# Patient Record
Sex: MALE | Race: WHITE | NOT HISPANIC OR LATINO | Employment: UNEMPLOYED | URBAN - NONMETROPOLITAN AREA
[De-identification: names, ages, dates, MRNs, and addresses within clinical notes are randomized per-mention and may not be internally consistent; named-entity substitution may affect disease eponyms.]

---
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
## 2022-11-20 NOTE — PROGRESS NOTES
Lonnie Aviles  2000  16 y.o. male  Patient presents today for bilateral plantar warts.     08/14/2017  Chief Complaint   Patient presents with   • Left Foot - Plantar Warts   • Right Foot - Plantar Warts           History of Present Illness    Lonnie Aviles is a 16 y.o. male who presents accompanied by his mother for evaluation of bilateral foot skin lesions.  He states that he first noticed these lesions several weeks prior.  He does note sharp pain with weightbearing due to these lesions.  His mother believes there plantar warts.  He has a sister who also has plantar warts who is given a prescription acid to treat them.  Patient states he tried to assess with no improvement.  He denies similar skin lesions elsewhere.  He denies any other treatments for this issue.      Past Medical History:   Diagnosis Date   • Allergic rhinitis    • GERD (gastroesophageal reflux disease)    • Upper respiratory infection    • Well child visit          Past Surgical History:   Procedure Laterality Date   • ADENOIDECTOMY     • DENTAL PROCEDURE     • TONSILLECTOMY           Family History   Problem Relation Age of Onset   • Heart disease Maternal Grandmother    • COPD Maternal Grandmother    • Heart disease Paternal Grandmother    • COPD Paternal Grandmother          Social History     Social History   • Marital status: Single     Spouse name: N/A   • Number of children: N/A   • Years of education: N/A     Occupational History   • Not on file.     Social History Main Topics   • Smoking status: Never Smoker   • Smokeless tobacco: Not on file      Comment: parents smoke ecig   • Alcohol use No   • Drug use: No   • Sexual activity: Defer     Other Topics Concern   • Not on file     Social History Narrative         Current Outpatient Prescriptions   Medication Sig Dispense Refill   • cetirizine (zyrTEC) 10 MG tablet Take 1 tablet by mouth Daily. 30 tablet 0   • Loratadine 10 MG capsule Take 1  "tablet by mouth Daily.     • cimetidine (TAGAMET) 800 MG tablet Take 1 tablet by mouth 3 (Three) Times a Day. 90 tablet 0     No current facility-administered medications for this visit.          OBJECTIVE    Ht 73\" (185.4 cm)  Wt 280 lb (127 kg)  BMI 36.94 kg/m2      Review of Systems   Constitutional:  Denies recent weight loss, weight gain, fever or chills, no change in exercise tolerance  Musculoskeletal: Toe/foot pain.   Skin: Plantar warts  Neurological: Denies paresthesias.  Psychiatric/Behavioral: Denies depression      Physical Exam   Constitutional: he appears well-developed and well-nourished.   CV: No chest pain. Normal RR  Resp: Non labored respirations  Psychiatric: he has a normal mood and affect. her behavior is normal.      Lower Extremity Exam:  Vascular: DP/PT pulses palpable 2+.   No edema  Toes warm  Neuro: Protective sensation intact, b/l.  DTRs intact  Integument: No open wounds.  No erythema, scaling  Sixteen discrete plantar verruca to bilateral forefoot,L>R. +pain on lateral squeeze test.  Musculoskeletal: LE muscle strength 5/5.   Gait normal  Ankle ROM full without pain or crepitus  STJ ROM full without pain or crepitus  No digital deformities      Bilateral foot destruction lesion:  Risks, benefits, aftercare discussed.  Overlying hyperkeratotic tissue debrided with 15 blade to pinpoint bleeding.  Cantharidin/salicylic acid applied. Band aid applied.  Pt tolerated well.        ASSESSMENT AND PLAN    Lonnie was seen today for plantar warts and plantar warts.    Diagnoses and all orders for this visit:    Plantar warts    Pain in both feet    Other orders  -     cimetidine (TAGAMET) 800 MG tablet; Take 1 tablet by mouth 3 (Three) Times a Day.    -Comprehensive foot and ankle exam performed  -Educated pt on diagnosis, etiology and treatment of plantar verruca.  -Overlying hyperkeratosis debrided with 15 blade  -Cantharidin/Salicylic acid mixture applied to lesion, covered with " band-aid.  -May soak/wash area after 6 hours. Keep area covered as skin may blister. After care instructions given.   -Rx Tagamet 800 mg 3 times a day  -Recheck 2 weeks for debridement          This document has been electronically signed by Rehan King DPM on August 14, 2017 6:54 PM     EMR Dragon/Transcription disclaimer:   Much of this encounter note is an electronic transcription/translation of spoken language to printed text. The electronic translation of spoken language may permit erroneous, or at times, nonsensical words or phrases to be inadvertently transcribed; Although I have reviewed the note for such errors, some may still exist.    Rehan King DPM  8/14/2017  6:54 PM             Smokes on weekends or when drinking

## 2024-09-23 ENCOUNTER — LAB (OUTPATIENT)
Dept: LAB | Facility: HOSPITAL | Age: 24
End: 2024-09-23
Payer: MEDICAID

## 2024-09-23 DIAGNOSIS — N46.9 INFERTILITY MALE: Primary | ICD-10-CM

## 2024-09-23 LAB
CHARACTER SMN: ABNORMAL
COLLECTION METHOD SMN: ABNORMAL
COLOR SMN: ABNORMAL
COMPLETE EJACULATE: YES
MOTILITY: ABNORMAL
PH SMN: 7.5 [PH] (ref 7–8.5)
SEX ABSTIN DURATION TIME PATIENT: 5 DAYS
SPECIMEN VOL SMN: 5 ML (ref 1.5–5)
SPERM COUNT: 53.5 MILLIONS/ML (ref 60–200)
SPERM MORPHOLOGY: ABNORMAL
VISC SMN QL: ABNORMAL

## 2024-09-23 PROCEDURE — 89320 SEMEN ANAL VOL/COUNT/MOT: CPT
